# Patient Record
Sex: MALE | Race: WHITE
[De-identification: names, ages, dates, MRNs, and addresses within clinical notes are randomized per-mention and may not be internally consistent; named-entity substitution may affect disease eponyms.]

---

## 2022-04-04 ENCOUNTER — HOSPITAL ENCOUNTER (OUTPATIENT)
Dept: HOSPITAL 63 - SURG | Age: 77
Discharge: HOME | End: 2022-04-04
Attending: OPHTHALMOLOGY
Payer: MEDICARE

## 2022-04-04 VITALS — SYSTOLIC BLOOD PRESSURE: 141 MMHG | DIASTOLIC BLOOD PRESSURE: 92 MMHG

## 2022-04-04 DIAGNOSIS — Z79.899: ICD-10-CM

## 2022-04-04 DIAGNOSIS — H52.202: ICD-10-CM

## 2022-04-04 DIAGNOSIS — M19.90: ICD-10-CM

## 2022-04-04 DIAGNOSIS — I10: ICD-10-CM

## 2022-04-04 DIAGNOSIS — H25.12: Primary | ICD-10-CM

## 2022-04-04 DIAGNOSIS — Z72.89: ICD-10-CM

## 2022-04-04 DIAGNOSIS — Z85.828: ICD-10-CM

## 2022-04-04 PROCEDURE — 66984 XCAPSL CTRC RMVL W/O ECP: CPT

## 2022-04-04 PROCEDURE — V2632 POST CHMBR INTRAOCULAR LENS: HCPCS

## 2022-04-04 RX ADMIN — TROPICAMIDE SCH DROP: 10 SOLUTION/ DROPS OPHTHALMIC at 08:51

## 2022-04-04 RX ADMIN — PHENYLEPHRINE HYDROCHLORIDE SCH DROP: 25 SOLUTION/ DROPS OPHTHALMIC at 09:04

## 2022-04-04 RX ADMIN — PHENYLEPHRINE HYDROCHLORIDE SCH DROP: 25 SOLUTION/ DROPS OPHTHALMIC at 09:11

## 2022-04-04 RX ADMIN — TROPICAMIDE SCH DROP: 10 SOLUTION/ DROPS OPHTHALMIC at 09:05

## 2022-04-04 RX ADMIN — KETOROLAC TROMETHAMINE SCH DROP: 5 SOLUTION OPHTHALMIC at 09:04

## 2022-04-04 RX ADMIN — TROPICAMIDE SCH DROP: 10 SOLUTION/ DROPS OPHTHALMIC at 09:11

## 2022-04-04 RX ADMIN — KETOROLAC TROMETHAMINE SCH DROP: 5 SOLUTION OPHTHALMIC at 08:51

## 2022-04-04 RX ADMIN — TOBRAMYCIN SCH DROP: 3 SOLUTION OPHTHALMIC at 08:51

## 2022-04-04 RX ADMIN — PHENYLEPHRINE HYDROCHLORIDE SCH DROP: 25 SOLUTION/ DROPS OPHTHALMIC at 08:51

## 2022-04-04 RX ADMIN — TOBRAMYCIN SCH DROP: 3 SOLUTION OPHTHALMIC at 09:04

## 2022-04-04 NOTE — PDOC4
SURGEON:


Phu Dunlap MD


Date of Procedure:   4/4/22





PREOP Diagnosis


Visually significant cataract:  Left Eye OS


Pre-existing astigmatism:  Left Eye OS





POSTOP Diagnosis


Same





PROCEDURE:


Phaco w/ posterior chamber IOL:  Left Eye OS





ANESTHESIA


                  








 Deep forniceal periocular 2% Lidocaine jelly














 [place]/retro bulbar block with 2% Lidocaine with 0.5% Marcaine











DESCRIPTION OF PROCEDURE


The risks, benefits, and alternatives were discussed with the patient who 

elected to proceed.  Informed consent was obtained in writing and placed in the 

chart  After anesthetizing the eye topically, the patient was taken to the 

operating room, and the operative eye was prepped and draped in the usual 

sterile fashion for ocular surgery.  A wire lid speculum was placed.





A 1-mm clear corneal paracentesis incision was created with the side-port blade 

at a position three o'clock hours clockwise from the temporal cornea.  Then, 1% 

non-preserved Lidocaine with epinephrine was injected into the anterior chamber 

followed by viscoelastic.  Cotton-tipped applicators were used to stabilize the 

globe, and a 2.4 mm keratome was used to create a self-sealing incision in clear

cornea at the temporal limbus.  The Utrata forceps were used to create a 

continuous curvilinear capsulorrhexis.  Balanced saline solution was injected 

via cannula beneath the capsulorrhexis edge to hydrodissect the lens nucleus and

cortex from the lens capsule.  The phacoemulsification handpiece and a chopping 

instrument were then used to remove the lens nucleus.  The remaining epinuclear 

material and cortex were removed with the irrigation/aspiration handpiece.  

Viscoelastic was used to re-inflate the lens capsule, and the intraocular lens 

was injected directly into the capsular bag.  The corneal wound edges were 

hydrated with balanced salt solution on a cannula and the irrigation/aspiration


handpiece was used to extract the remaining viscoelastic.   Cefuroxime 0.1mg/ml 

/ Vigamox 0.5% was injected into the anterior chamber intracamerally.  The 

wounds were inspected and found to be watertight at an appropriate intraocular 

pressure.  Topical antibiotic drops were placed on the corneal surface.


LRI:  No


                               


If Yes,  Number []       Axis []         Length [] degrees         Depth [] 

microns


Incision Axis:  180


               


Toric Lens Axis [119]


Patch/shield with Maxitrol/Tobradex/Erythromycin ointment: 








 Yes














 No 








                            


                                 


Co-managed patients/postop examination stable for co-management with referring 

doctor.





EBL


EBL:  None





SPECIMANS COLLECTED


Specimens Collected:  None











PHU DUNLAP MD              Apr 4, 2022 10:03

## 2022-05-12 ENCOUNTER — HOSPITAL ENCOUNTER (OUTPATIENT)
Dept: HOSPITAL 63 - SURG | Age: 77
Discharge: HOME | End: 2022-05-12
Attending: OPHTHALMOLOGY
Payer: MEDICARE

## 2022-05-12 VITALS — DIASTOLIC BLOOD PRESSURE: 95 MMHG | SYSTOLIC BLOOD PRESSURE: 132 MMHG

## 2022-05-12 DIAGNOSIS — Z79.899: ICD-10-CM

## 2022-05-12 DIAGNOSIS — Z72.89: ICD-10-CM

## 2022-05-12 DIAGNOSIS — H25.11: Primary | ICD-10-CM

## 2022-05-12 PROCEDURE — 93005 ELECTROCARDIOGRAM TRACING: CPT

## 2022-05-12 PROCEDURE — 66984 XCAPSL CTRC RMVL W/O ECP: CPT

## 2022-05-12 PROCEDURE — V2632 POST CHMBR INTRAOCULAR LENS: HCPCS

## 2022-05-12 RX ADMIN — TROPICAMIDE SCH DROP: 10 SOLUTION/ DROPS OPHTHALMIC at 10:20

## 2022-05-12 RX ADMIN — TROPICAMIDE SCH DROP: 10 SOLUTION/ DROPS OPHTHALMIC at 10:25

## 2022-05-12 RX ADMIN — PHENYLEPHRINE HYDROCHLORIDE SCH DROP: 25 SOLUTION/ DROPS OPHTHALMIC at 10:20

## 2022-05-12 RX ADMIN — TOBRAMYCIN SCH DROP: 3 SOLUTION OPHTHALMIC at 10:20

## 2022-05-12 RX ADMIN — PHENYLEPHRINE HYDROCHLORIDE SCH DROP: 25 SOLUTION/ DROPS OPHTHALMIC at 10:35

## 2022-05-12 RX ADMIN — KETOROLAC TROMETHAMINE SCH DROP: 5 SOLUTION OPHTHALMIC at 10:20

## 2022-05-12 RX ADMIN — TROPICAMIDE SCH DROP: 10 SOLUTION/ DROPS OPHTHALMIC at 10:35

## 2022-05-12 RX ADMIN — TOBRAMYCIN SCH DROP: 3 SOLUTION OPHTHALMIC at 10:25

## 2022-05-12 RX ADMIN — PHENYLEPHRINE HYDROCHLORIDE SCH DROP: 25 SOLUTION/ DROPS OPHTHALMIC at 10:25

## 2022-05-12 RX ADMIN — KETOROLAC TROMETHAMINE SCH DROP: 5 SOLUTION OPHTHALMIC at 10:25

## 2022-05-12 NOTE — NUR
Anesthesia notified or abnormal heart rythm. Patient instructed to follow up with PCP or 
Cardiology.

## 2022-05-12 NOTE — PDOC4
SURGEON:


Phu Dunlap MD


Date of Procedure:   5/12/22





PREOP Diagnosis


Visually significant cataract:  Right Eye OD


Pre-existing astigmatism:  Right Eye OD





POSTOP Diagnosis


Same





PROCEDURE:


Phaco w/ posterior chamber IOL:  Right Eye OD





ANESTHESIA


                  








x Deep forniceal periocular 2% Lidocaine jelly














 [place]/retro bulbar block with 2% Lidocaine with 0.5% Marcaine











DESCRIPTION OF PROCEDURE


The risks, benefits, and alternatives were discussed with the patient who 

elected to proceed.  Informed consent was obtained in writing and placed in the 

chart  After anesthetizing the eye topically, the patient was taken to the 

operating room, and the operative eye was prepped and draped in the usual 

sterile fashion for ocular surgery.  A wire lid speculum was placed.





A 1-mm clear corneal paracentesis incision was created with the side-port blade 

at a position three o'clock hours clockwise from the temporal cornea.  Then, 1% 

non-preserved Lidocaine with epinephrine was injected into the anterior chamber 

followed by viscoelastic.  Cotton-tipped applicators were used to stabilize the 

globe, and a 2.4 mm keratome was used to create a self-sealing incision in clear

cornea at the temporal limbus.  The Utrata forceps were used to create a 

continuous curvilinear capsulorrhexis.  Balanced saline solution was injected 

via cannula beneath the capsulorrhexis edge to hydrodissect the lens nucleus and

cortex from the lens capsule.  The phacoemulsification handpiece and a chopping 

instrument were then used to remove the lens nucleus.  The remaining epinuclear 

material and cortex were removed with the irrigation/aspiration handpiece.  

Viscoelastic was used to re-inflate the lens capsule, and the intraocular lens 

was injected directly into the capsular bag.  The corneal wound edges were 

hydrated with balanced salt solution on a cannula and the irrigation/aspiration


handpiece was used to extract the remaining viscoelastic.   Cefuroxime 0.1mg/ml 

/ Vigamox 0.5% was injected into the anterior chamber intracamerally.  The 

wounds were inspected and found to be watertight at an appropriate intraocular 

pressure.  Topical antibiotic drops were placed on the corneal surface.


LRI:  No


                               


If Yes,  Number []       Axis []         Length [] degrees         Depth [] 

microns


Incision Axis:  180


               


Toric Lens Axis [053]


Patch/shield with Maxitrol/Tobradex/Erythromycin ointment: 








 Yes














 No 








                            


                                 


Co-managed patients/postop examination stable for co-management with referring 

doctor.





EBL


EBL:  None





SPECIMANS COLLECTED


Specimens Collected:  None











PHU DUNLAP MD             May 12, 2022 11:20

## 2024-02-18 NOTE — EKG
[facility] [address], [place] [address]

Test Date:    2022               Test Time:    10:29:45

Pat Name:     ESTRADA ROUSE             Department:   

Patient ID:   [id number]           Room:          

Gender:       M                        Technician:   ALEJANDRO

:          1945               Requested By: CHERYL BURGESS

Order Number: [id number]            Reading MD:   Jamaal Luke

                                 Measurements

Intervals                              Axis          

Rate:         73                       P:            90

SD:           156                      QRS:          51

QRSD:         86                       T:            -62

QT:           478                                    

QTc:          531                                    

                           Interpretive Statements

ATRIAL FLUTTER

ST & T ABNORMALITY, CONSIDER

ANTERIOR ISCHEMIA OR LEFT VENTRICULAR STRAIN

INFEROLATERAL ISCHEMIA OR LEFT VENTRICULAR STRAIN

ABNORMAL ECG

RI6.02

No previous ECG available for comparison

Electronically Signed On 2022 14:47:47 CDT by Jamaal Luke
PAST SURGICAL HISTORY:  S/P total knee replacement, right